# Patient Record
Sex: MALE | URBAN - METROPOLITAN AREA
[De-identification: names, ages, dates, MRNs, and addresses within clinical notes are randomized per-mention and may not be internally consistent; named-entity substitution may affect disease eponyms.]

---

## 2021-12-06 ENCOUNTER — OFFICE VISIT (OUTPATIENT)
Dept: URBAN - METROPOLITAN AREA CLINIC 44 | Facility: CLINIC | Age: 66
End: 2021-12-06
Payer: MEDICARE

## 2021-12-06 DIAGNOSIS — H25.13 AGE-RELATED NUCLEAR CATARACT, BILATERAL: Primary | ICD-10-CM

## 2021-12-06 DIAGNOSIS — H43.393 OTHER VITREOUS OPACITIES, BILATERAL: ICD-10-CM

## 2021-12-06 DIAGNOSIS — H40.013 OPEN ANGLE WITH BORDERLINE FINDINGS, LOW RISK, BILATERAL: ICD-10-CM

## 2021-12-06 DIAGNOSIS — H04.123 TEAR FILM INSUFFICIENCY OF BILATERAL LACRIMAL GLANDS: ICD-10-CM

## 2021-12-06 PROCEDURE — 92134 CPTRZ OPH DX IMG PST SGM RTA: CPT | Performed by: OPTOMETRIST

## 2021-12-06 PROCEDURE — 92133 CPTRZD OPH DX IMG PST SGM ON: CPT | Performed by: OPTOMETRIST

## 2021-12-06 PROCEDURE — 99204 OFFICE O/P NEW MOD 45 MIN: CPT | Performed by: OPTOMETRIST

## 2021-12-06 ASSESSMENT — INTRAOCULAR PRESSURE
OS: 20
OD: 20

## 2021-12-06 ASSESSMENT — VISUAL ACUITY
OD: 20/20
OS: 20/25

## 2021-12-06 ASSESSMENT — KERATOMETRY
OD: 44.25
OS: 44.50

## 2021-12-06 NOTE — IMPRESSION/PLAN
Impression: Tear film insufficiency of bilateral lacrimal glands: H04.123. Clinical evaluation shows mild DED signs. Patient reports no or occasional symptoms not interfering with daily activities. Plan: PLAN: Warm compresses for 10 minutes AM (Yaya Mask or equal). Recommend Lipid based tears to be used 4X daily. RTC if symptom's worsen.

## 2021-12-06 NOTE — IMPRESSION/PLAN
Impression: Open angle with borderline findings, low risk, bilateral: H40.013. Vertical cupping OD .33, OS . 59 with RNFL Average , OS 92,  +Fm Hx. IOP 20 OU Plan: PLAN: Discussed findings. Observe. RTC 6 months for 24-2 VF + complete + possible GCA.

## 2021-12-06 NOTE — IMPRESSION/PLAN
Impression: Age-related nuclear cataract, bilateral: H25.13. Visually significant/symptomatic cataracts. BCVA 20/20OD, 20/25OS. Glare 20/40OD, 20/100OS. Plan: PLAN: RTC 6 months for complete exam complete + possible OCT (Mac). RTC sooner if patient symptoms become worse.

## 2022-06-14 ENCOUNTER — OFFICE VISIT (OUTPATIENT)
Dept: URBAN - METROPOLITAN AREA CLINIC 44 | Facility: CLINIC | Age: 67
End: 2022-06-14
Payer: MEDICARE

## 2022-06-14 DIAGNOSIS — H04.123 TEAR FILM INSUFFICIENCY OF BILATERAL LACRIMAL GLANDS: ICD-10-CM

## 2022-06-14 DIAGNOSIS — H40.013 OPEN ANGLE WITH BORDERLINE FINDINGS, LOW RISK, BILATERAL: Primary | ICD-10-CM

## 2022-06-14 DIAGNOSIS — H25.13 AGE-RELATED NUCLEAR CATARACT, BILATERAL: ICD-10-CM

## 2022-06-14 PROCEDURE — 92134 CPTRZ OPH DX IMG PST SGM RTA: CPT | Performed by: OPTOMETRIST

## 2022-06-14 PROCEDURE — 92083 EXTENDED VISUAL FIELD XM: CPT | Performed by: OPTOMETRIST

## 2022-06-14 PROCEDURE — 99214 OFFICE O/P EST MOD 30 MIN: CPT | Performed by: OPTOMETRIST

## 2022-06-14 ASSESSMENT — KERATOMETRY
OS: 44.63
OD: 44.25

## 2022-06-14 ASSESSMENT — INTRAOCULAR PRESSURE
OD: 23
OS: 24

## 2022-06-14 ASSESSMENT — VISUAL ACUITY
OS: 20/25
OD: 20/20

## 2022-06-14 NOTE — IMPRESSION/PLAN
Impression: Open angle with borderline findings, low risk, bilateral: H40.013. =IOP 23, 24
-Vertical cupping OD .33, OS . 59 with RNFL Average , OS 92,  GCA WNL OD 82 OS 82
-VF #1 WNL OU (OD % 6/22) (OS VFI 99% 6/22) +Fm Hx. Plan: PLAN: Discussed findings. Observe.  RTC 6 months for complete + RNFL

## 2022-06-14 NOTE — IMPRESSION/PLAN
Impression: Age-related nuclear cataract, bilateral: H25.13. Visually significant/symptomatic cataracts. BCVA 20/20 OD, 20/25 OS. Glare 20/25 OD, 20/60 OS. Plan: Discussed cataract findings and treatment options with patient. Rec CE/IOL to improve vision. Patient wishes to proceed with surgery. R/B/A to be discussed. OS only. Patient is a candidate for Multifocal, toric, and standard IOL. Goal is plano and patient understand they may need glasses for near vision. Discussed vision maybe limited by DED.   Hx of lasiks

## 2022-08-08 ENCOUNTER — OFFICE VISIT (OUTPATIENT)
Dept: URBAN - METROPOLITAN AREA CLINIC 44 | Facility: CLINIC | Age: 67
End: 2022-08-08
Payer: MEDICARE

## 2022-08-08 DIAGNOSIS — H25.13 AGE-RELATED NUCLEAR CATARACT, BILATERAL: Primary | ICD-10-CM

## 2022-08-08 DIAGNOSIS — Z01.818 ENCOUNTER FOR OTHER PREPROCEDURAL EXAMINATION: Primary | ICD-10-CM

## 2022-08-08 PROCEDURE — 99203 OFFICE O/P NEW LOW 30 MIN: CPT | Performed by: REGISTERED NURSE

## 2022-08-08 ASSESSMENT — PACHYMETRY
OD: 24.28
OD: 3.68
OS: 3.69
OS: 24.00

## 2022-08-11 ENCOUNTER — OFFICE VISIT (OUTPATIENT)
Dept: URBAN - METROPOLITAN AREA CLINIC 44 | Facility: CLINIC | Age: 67
End: 2022-08-11
Payer: MEDICARE

## 2022-08-11 DIAGNOSIS — H52.203 BILATERAL ASTIGMATISM: ICD-10-CM

## 2022-08-11 DIAGNOSIS — H04.123 TEAR FILM INSUFFICIENCY OF BILATERAL LACRIMAL GLANDS: ICD-10-CM

## 2022-08-11 DIAGNOSIS — H40.013 OPEN ANGLE WITH BORDERLINE FINDINGS, LOW RISK, BILATERAL: ICD-10-CM

## 2022-08-11 PROCEDURE — 99204 OFFICE O/P NEW MOD 45 MIN: CPT | Performed by: OPHTHALMOLOGY

## 2022-08-11 ASSESSMENT — INTRAOCULAR PRESSURE
OS: 20
OD: 19

## 2022-08-11 NOTE — IMPRESSION/PLAN
Impression: Tear film insufficiency of bilateral lacrimal glands: H04.123. Plan: artificial tears OU.

## 2022-08-11 NOTE — IMPRESSION/PLAN
Impression: Open angle with borderline findings, low risk, bilateral: H40.013. POSITIVE - Family Hx of Glaucoma father DENIES - Sulfa Allergy - Heart or Lung Problems - Sleep Apnea - Hx of Migraines Plan:  PLAN: Not on any glc meds - followed in general clinic ; test reviewed, IOP is improved ou   and   Discussed glaucoma may limit vision after surgery. Discussed possible unmasking of scotoma after surgery. cont to follow in general clinic TESTS: Reviewed Discussed Glaucoma diagnosis in detail with patient. Emphasized and explain complaince. poor compliance can lead to Blindness.

## 2022-08-11 NOTE — IMPRESSION/PLAN
Impression: Age-related nuclear cataract, bilateral: H25.13.
 - possible monovision  Plan: Patient states not too symptomatic and tolerating natural monovision currently , prefers to wait on surgery ; reschedule when pt mopre symptomatic, may consider monovision when ready

## 2023-01-09 ENCOUNTER — OFFICE VISIT (OUTPATIENT)
Dept: URBAN - METROPOLITAN AREA CLINIC 44 | Facility: CLINIC | Age: 68
End: 2023-01-09
Payer: MEDICARE

## 2023-01-09 DIAGNOSIS — H40.013 OPEN ANGLE WITH BORDERLINE FINDINGS, LOW RISK, BILATERAL: Primary | ICD-10-CM

## 2023-01-09 DIAGNOSIS — H43.393 OTHER VITREOUS OPACITIES, BILATERAL: ICD-10-CM

## 2023-01-09 DIAGNOSIS — H25.13 AGE-RELATED NUCLEAR CATARACT, BILATERAL: ICD-10-CM

## 2023-01-09 DIAGNOSIS — H04.123 TEAR FILM INSUFFICIENCY OF BILATERAL LACRIMAL GLANDS: ICD-10-CM

## 2023-01-09 PROCEDURE — 92014 COMPRE OPH EXAM EST PT 1/>: CPT | Performed by: OPTOMETRIST

## 2023-01-09 PROCEDURE — 92133 CPTRZD OPH DX IMG PST SGM ON: CPT | Performed by: OPTOMETRIST

## 2023-01-09 ASSESSMENT — INTRAOCULAR PRESSURE
OS: 20
OD: 22

## 2023-01-09 ASSESSMENT — VISUAL ACUITY
OS: 20/30
OD: 20/20

## 2023-01-09 ASSESSMENT — KERATOMETRY
OD: 44.38
OS: 44.63

## 2023-01-09 NOTE — IMPRESSION/PLAN
Impression: Open angle with borderline findings, low risk, bilateral: H40.013. =IOP 22, 20
-Vertical cupping OD .33, OS . 59 with RNFL Average , OS 92,  GCA WNL OD 82 OS 82
-VF #1 WNL OU (OD % 6/22) (OS VFI 99% 6/22) +Fm Hx. Plan: PLAN: Discussed findings. Observe.  RTC 6 months for 24-2 VF and IOP check + PACHs

## 2023-01-09 NOTE — IMPRESSION/PLAN
Impression: Other vitreous opacities, bilateral: H43.393. Plan: PLAN: Discussed findings and observe.  RTC STAT if experiences increase in floaters, flashes or veiling

## 2023-08-10 ENCOUNTER — OFFICE VISIT (OUTPATIENT)
Dept: URBAN - METROPOLITAN AREA CLINIC 44 | Facility: CLINIC | Age: 68
End: 2023-08-10
Payer: MEDICARE

## 2023-08-10 DIAGNOSIS — H40.013 OPEN ANGLE WITH BORDERLINE FINDINGS, LOW RISK, BILATERAL: Primary | ICD-10-CM

## 2023-08-10 PROCEDURE — 76514 ECHO EXAM OF EYE THICKNESS: CPT | Performed by: OPTOMETRIST

## 2023-08-10 PROCEDURE — 92083 EXTENDED VISUAL FIELD XM: CPT | Performed by: OPTOMETRIST

## 2023-08-10 PROCEDURE — 99213 OFFICE O/P EST LOW 20 MIN: CPT | Performed by: OPTOMETRIST

## 2023-08-10 ASSESSMENT — INTRAOCULAR PRESSURE
OS: 19
OD: 21

## 2024-03-05 ENCOUNTER — OFFICE VISIT (OUTPATIENT)
Dept: URBAN - METROPOLITAN AREA CLINIC 44 | Facility: CLINIC | Age: 69
End: 2024-03-05
Payer: MEDICARE

## 2024-03-05 DIAGNOSIS — H40.013 OPEN ANGLE WITH BORDERLINE FINDINGS, LOW RISK, BILATERAL: Primary | ICD-10-CM

## 2024-03-05 DIAGNOSIS — H43.393 OTHER VITREOUS OPACITIES, BILATERAL: ICD-10-CM

## 2024-03-05 DIAGNOSIS — H25.13 AGE-RELATED NUCLEAR CATARACT, BILATERAL: ICD-10-CM

## 2024-03-05 DIAGNOSIS — H53.002 UNSPECIFIED AMBLYOPIA, LEFT EYE: ICD-10-CM

## 2024-03-05 PROCEDURE — 92014 COMPRE OPH EXAM EST PT 1/>: CPT | Performed by: OPTOMETRIST

## 2024-03-05 PROCEDURE — 92133 CPTRZD OPH DX IMG PST SGM ON: CPT | Performed by: OPTOMETRIST

## 2024-03-05 ASSESSMENT — VISUAL ACUITY
OD: 20/25
OS: 20/50

## 2024-03-05 ASSESSMENT — INTRAOCULAR PRESSURE
OD: 20
OS: 20

## 2024-03-05 ASSESSMENT — KERATOMETRY
OS: 44.88
OD: 44.50